# Patient Record
Sex: FEMALE | Race: OTHER | HISPANIC OR LATINO | URBAN - METROPOLITAN AREA
[De-identification: names, ages, dates, MRNs, and addresses within clinical notes are randomized per-mention and may not be internally consistent; named-entity substitution may affect disease eponyms.]

---
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
## 2020-08-14 NOTE — ED PROVIDER NOTE - PHYSICAL EXAMINATION
Thank you for your visit today.  Please call me with any questions or concerns.   Vladimir Servin RN  Cardiology Care Coordinator  [phone], press option 1 then option 4   mild epigastric tenderness to palpation

## 2021-06-26 NOTE — ED ADULT NURSE NOTE - DISCHARGE DATE/TIME
Assumed care at 1900. Received report from day shift RN. Pt is awake and alert in bed, A&Ox 4, on RA, reports a pain level of 9/10. Fall precautions and appropriate signs in place. Call light and belongings within reach. Hourly rounding in place. Communication board updated. Pt requesting pain medication.   29-Nov-2018 15:20

## 2022-03-11 NOTE — ED PROVIDER NOTE - DR. NAME
[FreeTextEntry1] : Cleaned right arm wounds with Betadine, applied Steri-Strip to wound, covered with dry gauze and Tegaderm.  hemostasis occurred. 
Liu Leone)

## 2022-10-04 NOTE — ED PROVIDER NOTE - DISCUSSED CLINICAL AND RADIOLOGICAL FINDINGS WITH, MDM
patient 5-Fu Counseling: 5-Fluorouracil Counseling:  I discussed with the patient the risks of 5-fluorouracil including but not limited to erythema, scaling, itching, weeping, crusting, and pain.

## 2022-10-27 NOTE — ED ADULT TRIAGE NOTE - HEIGHT IN INCHES
No new care gaps identified.  [facility] Embedded Care Gaps. Reference number: [id number]. 10/27/2022   5:29:12 AM CDT   10

## 2022-12-30 PROBLEM — K29.70 GASTRITIS, UNSPECIFIED, WITHOUT BLEEDING: Chronic | Status: ACTIVE | Noted: 2019-12-05

## 2022-12-30 PROBLEM — S03.00XA DISLOCATION OF JAW, UNSPECIFIED SIDE, INITIAL ENCOUNTER: Chronic | Status: ACTIVE | Noted: 2019-12-05

## 2023-02-08 ENCOUNTER — APPOINTMENT (OUTPATIENT)
Dept: PAIN MANAGEMENT | Facility: CLINIC | Age: 23
End: 2023-02-08

## 2023-02-08 VITALS
HEART RATE: 76 BPM | HEIGHT: 58 IN | SYSTOLIC BLOOD PRESSURE: 123 MMHG | DIASTOLIC BLOOD PRESSURE: 83 MMHG | WEIGHT: 150 LBS | BODY MASS INDEX: 31.49 KG/M2

## 2023-03-07 NOTE — ED ADULT NURSE NOTE - CHIEF COMPLAINT
More awake and alert today. No new complaints. Psych following. Per RN , THASI placed in chart. EKG daily for rhythm check. Consulted RD    Review of Systems   Constitutional:  Positive for activity change. Negative for chills, diaphoresis and fever.   HENT:  Negative for congestion and drooling.    Eyes:  Negative for discharge and itching.   Respiratory:  Negative for apnea and chest tightness.    Gastrointestinal:  Negative for abdominal distention and abdominal pain.   Endocrine: Negative for cold intolerance and heat intolerance.   Genitourinary:  Negative for difficulty urinating and dysuria.   Musculoskeletal:  Negative for arthralgias and back pain.   Skin:  Negative for color change and pallor.   Neurological:  Negative for dizziness and facial asymmetry.   Psychiatric/Behavioral:  Positive for confusion, self-injury and suicidal ideas.    Objective:     Vital Signs (Most Recent):  Temp: 98 °F (36.7 °C) (03/06/23 1800)  Pulse: 83 (03/06/23 1930)  Resp: (!) 22 (03/06/23 1930)  BP: (!) 104/58 (03/06/23 1930)  SpO2: 100 % (03/06/23 1930)   Vital Signs (24h Range):  Temp:  [98 °F (36.7 °C)-98.2 °F (36.8 °C)] 98 °F (36.7 °C)  Pulse:  [] 83  Resp:  [16-22] 22  SpO2:  [95 %-100 %] 100 %  BP: ()/(50-89) 104/58     Weight: 68.9 kg (152 lb)  Body mass index is 24.53 kg/m².    Physical Exam  Constitutional:       Appearance: She is normal weight. She is ill-appearing.      Comments: Confused and drowsy   HENT:      Head: Normocephalic and atraumatic.      Nose: Nose normal.      Mouth/Throat:      Mouth: Mucous membranes are dry.      Pharynx: Oropharynx is clear.   Cardiovascular:      Rate and Rhythm: Regular rhythm. Tachycardia present.      Pulses: Normal pulses.      Heart sounds: Normal heart sounds.   Pulmonary:      Effort: Pulmonary effort is normal.      Breath sounds: Normal breath sounds.   Abdominal:      General: Abdomen is flat.      Palpations: Abdomen is soft.   Musculoskeletal:       The patient is a 17y Female complaining of std/sti check. Cervical back: Normal range of motion and neck supple.   Skin:     General: Skin is warm.   Neurological:      General: No focal deficit present.      Mental Status: She is disoriented.   Psychiatric:      Comments: depressed           Significant Labs: All pertinent labs within the past 24 hours have been reviewed.  BMP:   Recent Labs   Lab 03/06/23  1305   GLU 91      K 4.0      CO2 20*   BUN 13   CREATININE 0.9   CALCIUM 10.0   MG 2.1     CBC:   Recent Labs   Lab 03/06/23  1354 03/06/23  1359   WBC 7.69  --    HGB 14.4  --    HCT 44.7 43     --      CMP:   Recent Labs   Lab 03/06/23  1305      K 4.0      CO2 20*   GLU 91   BUN 13   CREATININE 0.9   CALCIUM 10.0   PROT 7.5   ALBUMIN 4.5   BILITOT 0.7   ALKPHOS 92   AST 18   ALT 11   ANIONGAP 13

## 2025-09-02 ENCOUNTER — NON-APPOINTMENT (OUTPATIENT)
Age: 25
End: 2025-09-02